# Patient Record
Sex: MALE | Race: OTHER | URBAN - METROPOLITAN AREA
[De-identification: names, ages, dates, MRNs, and addresses within clinical notes are randomized per-mention and may not be internally consistent; named-entity substitution may affect disease eponyms.]

---

## 2022-07-27 ENCOUNTER — OFFICE VISIT (OUTPATIENT)
Dept: PULMONOLOGY | Age: 57
End: 2022-07-27

## 2022-07-27 VITALS
HEART RATE: 76 BPM | SYSTOLIC BLOOD PRESSURE: 134 MMHG | RESPIRATION RATE: 18 BRPM | BODY MASS INDEX: 49.44 KG/M2 | OXYGEN SATURATION: 96 % | HEIGHT: 67 IN | TEMPERATURE: 97.5 F | WEIGHT: 315 LBS | DIASTOLIC BLOOD PRESSURE: 82 MMHG

## 2022-07-27 DIAGNOSIS — E66.2 OBESITY HYPOVENTILATION SYNDROME (HCC): Primary | ICD-10-CM

## 2022-07-27 DIAGNOSIS — Z87.891 FORMER TOBACCO USE: ICD-10-CM

## 2022-07-27 DIAGNOSIS — J96.11 CHRONIC RESPIRATORY FAILURE WITH HYPOXIA (HCC): ICD-10-CM

## 2022-07-27 PROBLEM — E78.2 MIXED HYPERLIPIDEMIA: Status: ACTIVE | Noted: 2022-05-11

## 2022-07-27 PROBLEM — E11.9 TYPE 2 DIABETES MELLITUS WITHOUT COMPLICATION, WITHOUT LONG-TERM CURRENT USE OF INSULIN (HCC): Status: ACTIVE | Noted: 2020-07-28

## 2022-07-27 PROBLEM — M54.42 ACUTE LEFT-SIDED LOW BACK PAIN WITH LEFT-SIDED SCIATICA: Status: ACTIVE | Noted: 2018-10-03

## 2022-07-27 PROBLEM — R79.89 ELEVATED LFTS: Status: ACTIVE | Noted: 2022-02-23

## 2022-07-27 PROBLEM — I82.431 ACUTE DEEP VEIN THROMBOSIS (DVT) OF POPLITEAL VEIN OF RIGHT LOWER EXTREMITY (HCC): Status: ACTIVE | Noted: 2022-02-06

## 2022-07-27 PROBLEM — I50.32 CHRONIC DIASTOLIC CONGESTIVE HEART FAILURE (HCC): Status: ACTIVE | Noted: 2022-02-03

## 2022-07-27 PROCEDURE — 99214 OFFICE O/P EST MOD 30 MIN: CPT | Performed by: NURSE PRACTITIONER

## 2022-07-27 RX ORDER — GLIPIZIDE 2.5 MG/1
TABLET, EXTENDED RELEASE ORAL
COMMUNITY
Start: 2022-05-18

## 2022-07-27 RX ORDER — POTASSIUM CHLORIDE 20 MEQ/1
20 TABLET, EXTENDED RELEASE ORAL DAILY
COMMUNITY
Start: 2022-02-28 | End: 2022-08-27

## 2022-07-27 RX ORDER — FUROSEMIDE 40 MG/1
40 TABLET ORAL 2 TIMES DAILY
COMMUNITY
Start: 2022-02-28 | End: 2022-08-27

## 2022-07-27 RX ORDER — UMECLIDINIUM BROMIDE AND VILANTEROL TRIFENATATE 62.5; 25 UG/1; UG/1
POWDER RESPIRATORY (INHALATION)
Qty: 1 EACH | Refills: 11 | Status: SHIPPED | OUTPATIENT
Start: 2022-07-27

## 2022-07-27 RX ORDER — CARVEDILOL 3.12 MG/1
3.12 TABLET ORAL 2 TIMES DAILY WITH MEALS
COMMUNITY
Start: 2022-02-28 | End: 2022-08-27

## 2022-07-27 RX ORDER — VALSARTAN 80 MG/1
80 TABLET ORAL DAILY
COMMUNITY
Start: 2022-02-28

## 2022-07-27 RX ORDER — ATORVASTATIN CALCIUM 80 MG/1
80 TABLET, FILM COATED ORAL NIGHTLY
COMMUNITY
Start: 2022-02-28 | End: 2022-08-27

## 2022-07-27 RX ORDER — UMECLIDINIUM BROMIDE AND VILANTEROL TRIFENATATE 62.5; 25 UG/1; UG/1
POWDER RESPIRATORY (INHALATION)
COMMUNITY
Start: 2022-02-28 | End: 2022-07-27 | Stop reason: SDUPTHER

## 2022-07-27 ASSESSMENT — ENCOUNTER SYMPTOMS
COUGH: 0
DIARRHEA: 0
NAUSEA: 0
CHEST TIGHTNESS: 0
VOMITING: 0
WHEEZING: 0
SORE THROAT: 0

## 2022-07-27 NOTE — PROGRESS NOTES
[address], [address] [facility]  [phone]      Patient Name:  Natty Morris  YOB: 1965  MRN:  [id number]      Office Visit 7/27/2022      Chief Complaint   Patient presents with    Breathing Problem       HISTORY OF PRESENT ILLNESS:      Mr. Stephy Cortes is a very pleasant 56year old male, PMH dCHF, HTN, DM II, and OHS here today for follow up for OHS. The patient was previously seen in our office being hospitalized for pneumonia and dCHF. The patient has had issues affording supplemental O2 and has not worked since February therefore he is not insured and cannot afford to have sleep study done. The patient does endorse having a home O2 concentrator and wears 3L at night and with exertion and states benefit of therapy. The patient states he sleeps well at night, however family member present with him in the office today states he snores \"like a [employer] train\". The patient was formerly employed as a  but cannot drive with oxygen. The patient denies any sick symptoms. He endorses being consistent with Lasix and Coreg, along with daily Anoro inhaler. He has been able to get his medications through [employer]. The patient denies any fevers, chills, nausea, or vomiting. Ambulatory O2 sat today reveals need for O2 at 2L NC.        Past Medical History:   Diagnosis Date    Diastolic heart failure ([address]     Hypertension     Morbid obesity ([address]     Type 2 diabetes mellitus ([address]          Patient Active Problem List   Diagnosis    Chronic respiratory failure with hypoxia (HCC)    Obesity hypoventilation syndrome (HCC)    Acute deep vein thrombosis (DVT) of popliteal vein of right lower extremity (HCC)    Acute left-sided low back pain with left-sided sciatica    Chronic diastolic congestive heart failure (HCC)    Elevated LFTs    Essential hypertension    Mixed hyperlipidemia    Type 2 diabetes mellitus without complication, without long-term current use of insulin [address]           No past surgical history on file. Social History     Socioeconomic History    Marital status: Legally      Spouse name: Not on file    Number of children: Not on file    Years of education: Not on file    Highest education level: Not on file   Occupational History    Not on file   Tobacco Use    Smoking status: Former     Packs/day: 1.00     Types: Cigarettes     Quit date: 2022     Years since quittin.5    Smokeless tobacco: Never   Substance and Sexual Activity    Alcohol use: Not on file    Drug use: Not on file    Sexual activity: Not on file   Other Topics Concern    Not on file   Social History Narrative    Not on file     Social Determinants of Health     Financial Resource Strain: Not on file   Food Insecurity: Not on file   Transportation Needs: Not on file   Physical Activity: Not on file   Stress: Not on file   Social Connections: Not on file   Intimate Partner Violence: Not on file   Housing Stability: Not on file         No family history on file. No Known Allergies      Current Outpatient Medications   Medication Sig    OXYGEN 3 lpm cont    apixaban (ELIQUIS) 5 MG TABS tablet Take 5 mg by mouth in the morning and 5 mg in the evening. atorvastatin (LIPITOR) 80 MG tablet Take 80 mg by mouth nightly    carvedilol (COREG) 3.125 MG tablet Take 3.125 mg by mouth in the morning and 3.125 mg in the evening. Take with meals. furosemide (LASIX) 40 MG tablet Take 40 mg by mouth in the morning and 40 mg before bedtime. glipiZIDE (GLUCOTROL XL) 2.5 MG extended release tablet TAKE 1 TABLET BY MOUTH EVERY MORNING    metFORMIN (GLUCOPHAGE) 1000 MG tablet TAKE 1 TABLET BY MOUTH TWICE DAILY WITH MEALS    potassium chloride (KLOR-CON M) 20 MEQ extended release tablet Take 20 mEq by mouth in the morning. valsartan (DIOVAN) 80 MG tablet Take 80 mg by mouth in the morning.     umeclidinium-vilanterol (ANORO ELLIPTA) 62.5-25 MCG/INH AEPB inhaler INHALE 1 PUFF BY MOUTH ONCE DAILY AT THE SAME TIME EACH DAY     No current facility-administered medications for this visit. Review of Systems   Constitutional:  Negative for chills, fatigue and fever. HENT:  Negative for congestion, nosebleeds and sore throat. Respiratory:  Negative for cough, chest tightness and wheezing. Cardiovascular:  Negative for chest pain, palpitations and leg swelling. Gastrointestinal:  Negative for diarrhea, nausea and vomiting. Neurological:  Negative for dizziness, light-headedness and headaches. PHYSICAL EXAM:    /82   Pulse 76   Temp 97.5 °F (36.4 °C) (Skin) Comment (Src): wrist  Resp 18   Ht 5' 7\" (1.702 m)   Wt (!) 323 lb 14.4 oz (146.9 kg)   SpO2 96%   BMI 50.73 kg/m²          GENERAL APPEARANCE:   The patient is normal weight and in no respiratory distress. HEENT:   PERRL. Conjunctivae unremarkable. Nasal mucosa is without epistaxis, exudate, or polyps. Gums and dentition are unremarkable. There is no oropharyngeal narrowing. TMs are clear. NECK/LYMPHATIC:   Symmetrical with no elevation of jugular venous pulsation. Trachea midline. No thyroid enlargement. No cervical adenopathy. LUNGS:   Normal respiratory effort with symmetrical lung expansion. Breath sounds clear upper bilaterally, diminished bases. HEART:   There is a regular rate and rhythm. No murmur, rub, or gallop. There is no edema in the lower extremities. ABDOMEN:   Soft and non-tender. No hepatosplenomegaly. Bowel sounds are normal.     SKIN:   There are no rashes, cyanosis, jaundice, or ecchymosis present. EXTREMITIES:   The extremities are unremarkable without clubbing, cyanosis, joint inflammation, degenerative, or ischemic change. MUSCULOSKELETAL:   There is no abnormal tone, muscle atrophy, or abnormal movement present. NEURO:   The patient is alert and oriented to person, place, and time.   Memory appears intact and mood is normal.  No gross sensorimotor

## 2022-10-28 ENCOUNTER — OFFICE VISIT (OUTPATIENT)
Dept: PULMONOLOGY | Age: 57
End: 2022-10-28

## 2022-10-28 VITALS
HEIGHT: 69 IN | OXYGEN SATURATION: 93 % | RESPIRATION RATE: 14 BRPM | BODY MASS INDEX: 46.65 KG/M2 | DIASTOLIC BLOOD PRESSURE: 80 MMHG | WEIGHT: 315 LBS | SYSTOLIC BLOOD PRESSURE: 124 MMHG | TEMPERATURE: 97.2 F | HEART RATE: 80 BPM

## 2022-10-28 DIAGNOSIS — E66.2 MORBID (SEVERE) OBESITY WITH ALVEOLAR HYPOVENTILATION (HCC): ICD-10-CM

## 2022-10-28 DIAGNOSIS — E66.2 OBESITY HYPOVENTILATION SYNDROME (HCC): ICD-10-CM

## 2022-10-28 DIAGNOSIS — J96.11 CHRONIC RESPIRATORY FAILURE WITH HYPOXIA (HCC): Primary | ICD-10-CM

## 2022-10-28 DIAGNOSIS — R06.09 DYSPNEA ON EXERTION: ICD-10-CM

## 2022-10-28 DIAGNOSIS — I50.32 CHRONIC DIASTOLIC HEART FAILURE (HCC): ICD-10-CM

## 2022-10-28 PROCEDURE — 3074F SYST BP LT 130 MM HG: CPT | Performed by: INTERNAL MEDICINE

## 2022-10-28 PROCEDURE — 3079F DIAST BP 80-89 MM HG: CPT | Performed by: INTERNAL MEDICINE

## 2022-10-28 PROCEDURE — 99213 OFFICE O/P EST LOW 20 MIN: CPT | Performed by: INTERNAL MEDICINE

## 2022-10-28 ASSESSMENT — ENCOUNTER SYMPTOMS
SORE THROAT: 0
VOMITING: 0
NAUSEA: 0
COUGH: 0
WHEEZING: 0
CHEST TIGHTNESS: 0
DIARRHEA: 0

## 2022-10-28 NOTE — PROGRESS NOTES
[address], [address] [facility]  [phone]      Patient Name:  Ailyn Nava  YOB: 1965  MRN:  [id number]      Office Visit 10/28/2022      Chief Complaint   Patient presents with    Follow-up       HISTORY OF PRESENT ILLNESS:    LOV 07/27/2022 with Hiram Gene        Mr. Adonis Rogers is a very pleasant 56year old male, PMH dCHF, HTN, DM II, and OHS here today for follow up for OHS. The patient was previously seen in our office being hospitalized for pneumonia and dCHF. The patient has had issues affording supplemental O2 and has not worked since February therefore he is not insured and cannot afford to have sleep study done. The patient does endorse having a home O2 concentrator and wears 3L at night and with exertion and states benefit of therapy. The patient states he sleeps well at night, however family member present with him in the office today states he snores \"like a [employer] train\". The patient was formerly employed as a  but cannot drive with oxygen. The patient denies any sick symptoms. He endorses being consistent with Lasix and Coreg, along with daily Anoro inhaler. He has been able to get his medications through [employer]. The patient denies any fevers, chills, nausea, or vomiting. Ambulatory O2 sat today reveals need for O2 at 2L NC.   ASSESSMENT:  (Medical Decision Making)        Diagnosis Orders   1. Chronic respiratory failure with hypoxia (HCC)        2. Dyspnea on exertion        3. Morbid (severe) obesity with alveolar hypoventilation (HCC)        4. Obesity hypoventilation syndrome ([address]        5.  Chronic diastolic heart failure (HCC)               PLAN:    --Continue Anoro daily, samples given  --Continue O2 at 2L NC with exertion  --Still needs sleep study in the future when finances are available for this  --Continue diuretics, beta blocker per Cardiology recommendations  --Follow up in 3 months, sooner if needed    October 28, 2022:    The patient continues to do relatively well although he continues to have chronic hypoxic respiratory failure, unfortunately he cannot afford oxygen and therefore he does not wear it on exertion. He also has not had his sleep study yet since he cannot afford that either, or trying to help him through [address] [place] [address] services and apparently his oxygen DME through [employer] was supposed to go through a medical hardship/financial hardship track but it seems that this is either lagging or not occurring. He has not been hospitalized since his last visit, he is compliant with Anoro he states that he has lost about 50 pounds in the past year but continues to be obese and needs to lose some more weight at this point. He is aware of this and doing everything he can. He has significant financial difficulties cannot afford any additional expenses and all he pays for is his storage facility car and phone he has his brother and other family members to help him. Past Medical History:   Diagnosis Date    Diastolic heart failure ([facility]     Hypertension     Morbid obesity ([facility]     Type 2 diabetes mellitus ([facility]          Patient Active Problem List   Diagnosis    Chronic respiratory failure with hypoxia (HCC)    Obesity hypoventilation syndrome (HCC)    Acute deep vein thrombosis (DVT) of popliteal vein of right lower extremity (HCC)    Acute left-sided low back pain with left-sided sciatica    Chronic diastolic congestive heart failure (HCC)    Elevated LFTs    Essential hypertension    Mixed hyperlipidemia    Type 2 diabetes mellitus without complication, without long-term current use of insulin ([facility]           No past surgical history on file.         Social History     Socioeconomic History    Marital status: Legally      Spouse name: Not on file    Number of children: Not on file    Years of education: Not on file    Highest education level: Not on file   Occupational History    Not on file   Tobacco Use Smoking status: Former     Packs/day: 1.00     Years: 20.00     Pack years: 20.00     Types: Cigarettes     Quit date: 2022     Years since quittin.8    Smokeless tobacco: Never   Substance and Sexual Activity    Alcohol use: Not on file    Drug use: Not on file    Sexual activity: Not on file   Other Topics Concern    Not on file   Social History Narrative    Not on file     Social Determinants of Health     Financial Resource Strain: Not on file   Food Insecurity: Not on file   Transportation Needs: Not on file   Physical Activity: Not on file   Stress: Not on file   Social Connections: Not on file   Intimate Partner Violence: Not on file   Housing Stability: Not on file         No family history on file. No Known Allergies      Current Outpatient Medications   Medication Sig    OXYGEN 3 lpm cont    glipiZIDE (GLUCOTROL XL) 2.5 MG extended release tablet TAKE 1 TABLET BY MOUTH EVERY MORNING    metFORMIN (GLUCOPHAGE) 1000 MG tablet TAKE 1 TABLET BY MOUTH TWICE DAILY WITH MEALS    valsartan (DIOVAN) 80 MG tablet Take 80 mg by mouth in the morning. umeclidinium-vilanterol (ANORO ELLIPTA) 62.5-25 MCG/INH AEPB inhaler INHALE 1 PUFF BY MOUTH ONCE DAILY AT THE SAME TIME EACH DAY    apixaban (ELIQUIS) 5 MG TABS tablet Take 5 mg by mouth in the morning and 5 mg in the evening. atorvastatin (LIPITOR) 80 MG tablet Take 80 mg by mouth nightly    carvedilol (COREG) 3.125 MG tablet Take 3.125 mg by mouth in the morning and 3.125 mg in the evening. Take with meals. furosemide (LASIX) 40 MG tablet Take 40 mg by mouth in the morning and 40 mg before bedtime. potassium chloride (KLOR-CON M) 20 MEQ extended release tablet Take 20 mEq by mouth in the morning. No current facility-administered medications for this visit. Review of Systems   Constitutional:  Negative for chills, fatigue and fever. HENT:  Negative for congestion, nosebleeds and sore throat.     Respiratory:  Negative for cough, chest tightness and wheezing. Cardiovascular:  Negative for chest pain, palpitations and leg swelling. Gastrointestinal:  Negative for diarrhea, nausea and vomiting. Neurological:  Negative for dizziness, light-headedness and headaches. PHYSICAL EXAM:    /80   Pulse 80   Temp 97.2 °F (36.2 °C)   Resp 14   Ht 5' 9\" (1.753 m)   Wt (!) 324 lb (147 kg)   SpO2 93%   BMI 47.85 kg/m²          GENERAL APPEARANCE:   The patient is morbidly obese t and in no respiratory distress. HEENT:   PERRL. Conjunctivae unremarkable. Nasal mucosa is without epistaxis, exudate, or polyps. Gums and dentition are unremarkable. There is no oropharyngeal narrowing. TMs are clear. NECK/LYMPHATIC:   Symmetrical with no elevation of jugular venous pulsation. Trachea midline. No thyroid enlargement. No cervical adenopathy. LUNGS:   Normal respiratory effort with symmetrical lung expansion. Breath sounds clear upper bilaterally, diminished bases. HEART:   There is a regular rate and rhythm. No murmur, rub, or gallop. There is no edema in the lower extremities. ABDOMEN:   Soft and non-tender. No hepatosplenomegaly. Bowel sounds are normal.     SKIN:   There are no rashes, cyanosis, jaundice, or ecchymosis present. EXTREMITIES:   The extremities are unremarkable without clubbing, cyanosis, joint inflammation, degenerative, or ischemic change. MUSCULOSKELETAL:   There is no abnormal tone, muscle atrophy, or abnormal movement present. NEURO:   The patient is alert and oriented to person, place, and time. Memory appears intact and mood is normal.  No gross sensorimotor deficits are present.       DIAGNOSTIC TESTS:      Spirometry:           Exercise oximetry:    Pulse oximetry assessment   94% at rest on room air (if 88% or less, skip next steps)  85% while ambulating on room air  93% at rest on 2LPM  97% while ambulating on 2LPM       ASSESSMENT:  (Medical Decision Making)        Diagnosis Orders   1. Chronic respiratory failure with hypoxia (HCC)        2. Dyspnea on exertion        3. Morbid (severe) obesity with alveolar hypoventilation (HCC)        4. Obesity hypoventilation syndrome ([address]        5. Chronic diastolic heart failure (HCC)        Second obesity hypoventilation, chronic respiratory failure, chronic diastolic heart failure, morbid obesity all contributing to hypoxia and unfortunately with a lot of financial social issues resulting in inability to comply with recommended treatment. He is applying for disability and he is indeed disabled with chronic respiratory failure and obesity hypoventilation. We will do our best on our side to try to see if there is any help we can offer or facilitate for the patient to receive at least oxygen with exertion he does need likely PAP therapy for obesity hypoventilation and chronic obstructive sleep apnea but these have to be documented first with a sleep study which at present he cannot afford. I will see the patient for follow-up in 6 months, he continues to receive Anoro and is encouraged to continue doing that. PLAN:    --Continue Anoro daily, samples given  --Continue O2 at 2L NC with exertion prior with [employer] about his financial hardship path as of now he is not using oxygen with exertion due to the inability to afford refills of his oxygen tanks. --Still needs sleep study in the future when finances are available for this  --Continue diuretics, beta blocker per Cardiology recommendations  --Follow up in 6months, sooner if needed               No orders of the defined types were placed in this encounter. Over 50% of today's office visit was spent in face to face time reviewing test results/records, prognosis, importance of compliance, education about disease process, benefits of medications, instructions for management of acute flare-ups, and follow up plans. Total time spent was 20 minutes.             Alena Ward MD  Electronically signed    Dictated using voice recognition software.   Proof read but unrecognized errors may exist.